# Patient Record
Sex: FEMALE | Race: WHITE | NOT HISPANIC OR LATINO | Employment: STUDENT | URBAN - METROPOLITAN AREA
[De-identification: names, ages, dates, MRNs, and addresses within clinical notes are randomized per-mention and may not be internally consistent; named-entity substitution may affect disease eponyms.]

---

## 2022-02-07 ENCOUNTER — APPOINTMENT (OUTPATIENT)
Dept: GENERAL RADIOLOGY | Facility: CLINIC | Age: 22
End: 2022-02-07
Attending: INTERNAL MEDICINE
Payer: COMMERCIAL

## 2022-02-07 ENCOUNTER — HOSPITAL ENCOUNTER (EMERGENCY)
Facility: CLINIC | Age: 22
Discharge: HOME | End: 2022-02-07
Admitting: NURSE PRACTITIONER
Payer: COMMERCIAL

## 2022-02-07 VITALS
BODY MASS INDEX: 20.24 KG/M2 | HEART RATE: 78 BPM | OXYGEN SATURATION: 99 % | WEIGHT: 110 LBS | RESPIRATION RATE: 16 BRPM | SYSTOLIC BLOOD PRESSURE: 125 MMHG | HEIGHT: 62 IN | DIASTOLIC BLOOD PRESSURE: 80 MMHG | TEMPERATURE: 99.8 F

## 2022-02-07 DIAGNOSIS — S60.222A CONTUSION OF LEFT HAND, INITIAL ENCOUNTER: ICD-10-CM

## 2022-02-07 PROCEDURE — 73130 X-RAY EXAM OF HAND: CPT | Mod: 26 | Performed by: RADIOLOGY

## 2022-02-07 PROCEDURE — 99283 EMERGENCY DEPT VISIT LOW MDM: CPT | Performed by: NURSE PRACTITIONER

## 2022-02-07 PROCEDURE — 73130 X-RAY EXAM OF HAND: CPT | Mod: LT

## 2022-02-07 RX ORDER — ESCITALOPRAM OXALATE 10 MG/1
10 TABLET ORAL DAILY
COMMUNITY
Start: 2022-01-24

## 2022-02-07 RX ORDER — NORGESTIMATE AND ETHINYL ESTRADIOL 0.25-0.035
1 KIT ORAL DAILY
COMMUNITY
Start: 2022-01-06

## 2022-02-07 ASSESSMENT — MIFFLIN-ST. JEOR: SCORE: 1217.21
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
## 2022-02-07 NOTE — Clinical Note
Wyatt was seen and treated in our emergency department on 2/7/2022.  She may return to school on 02/08/2022.      If you have any questions or concerns, please don't hesitate to call.      Amanda Tavarez APRN CNP